# Patient Record
Sex: FEMALE | Race: WHITE | NOT HISPANIC OR LATINO
[De-identification: names, ages, dates, MRNs, and addresses within clinical notes are randomized per-mention and may not be internally consistent; named-entity substitution may affect disease eponyms.]

---

## 2017-06-04 ENCOUNTER — RESULT REVIEW (OUTPATIENT)
Age: 52
End: 2017-06-04

## 2017-11-21 ENCOUNTER — OUTPATIENT (OUTPATIENT)
Dept: OUTPATIENT SERVICES | Facility: HOSPITAL | Age: 52
LOS: 1 days | End: 2017-11-21
Payer: COMMERCIAL

## 2017-11-21 VITALS
HEIGHT: 60 IN | TEMPERATURE: 98 F | HEART RATE: 71 BPM | WEIGHT: 158.07 LBS | OXYGEN SATURATION: 99 % | RESPIRATION RATE: 14 BRPM

## 2017-11-21 DIAGNOSIS — D25.1 INTRAMURAL LEIOMYOMA OF UTERUS: ICD-10-CM

## 2017-11-21 DIAGNOSIS — Z01.818 ENCOUNTER FOR OTHER PREPROCEDURAL EXAMINATION: ICD-10-CM

## 2017-11-21 LAB
ANION GAP SERPL CALC-SCNC: 14 MMOL/L — SIGNIFICANT CHANGE UP (ref 5–17)
BLD GP AB SCN SERPL QL: NEGATIVE — SIGNIFICANT CHANGE UP
BUN SERPL-MCNC: 15 MG/DL — SIGNIFICANT CHANGE UP (ref 7–23)
CALCIUM SERPL-MCNC: 9.4 MG/DL — SIGNIFICANT CHANGE UP (ref 8.4–10.5)
CHLORIDE SERPL-SCNC: 103 MMOL/L — SIGNIFICANT CHANGE UP (ref 96–108)
CO2 SERPL-SCNC: 22 MMOL/L — SIGNIFICANT CHANGE UP (ref 22–31)
CREAT SERPL-MCNC: 0.59 MG/DL — SIGNIFICANT CHANGE UP (ref 0.5–1.3)
GLUCOSE SERPL-MCNC: 87 MG/DL — SIGNIFICANT CHANGE UP (ref 70–99)
HCT VFR BLD CALC: 34.8 % — SIGNIFICANT CHANGE UP (ref 34.5–45)
HGB BLD-MCNC: 10.9 G/DL — LOW (ref 11.5–15.5)
MCHC RBC-ENTMCNC: 19.2 PG — LOW (ref 27–34)
MCHC RBC-ENTMCNC: 31.3 GM/DL — LOW (ref 32–36)
MCV RBC AUTO: 61.3 FL — LOW (ref 80–100)
PLATELET # BLD AUTO: 283 K/UL — SIGNIFICANT CHANGE UP (ref 150–400)
POTASSIUM SERPL-MCNC: 4.2 MMOL/L — SIGNIFICANT CHANGE UP (ref 3.5–5.3)
POTASSIUM SERPL-SCNC: 4.2 MMOL/L — SIGNIFICANT CHANGE UP (ref 3.5–5.3)
RBC # BLD: 5.68 M/UL — HIGH (ref 3.8–5.2)
RBC # FLD: 15.8 % — HIGH (ref 10.3–14.5)
RH IG SCN BLD-IMP: POSITIVE — SIGNIFICANT CHANGE UP
SODIUM SERPL-SCNC: 139 MMOL/L — SIGNIFICANT CHANGE UP (ref 135–145)
WBC # BLD: 6.86 K/UL — SIGNIFICANT CHANGE UP (ref 3.8–10.5)
WBC # FLD AUTO: 6.86 K/UL — SIGNIFICANT CHANGE UP (ref 3.8–10.5)

## 2017-11-21 PROCEDURE — 86850 RBC ANTIBODY SCREEN: CPT

## 2017-11-21 PROCEDURE — 80048 BASIC METABOLIC PNL TOTAL CA: CPT

## 2017-11-21 PROCEDURE — G0463: CPT

## 2017-11-21 PROCEDURE — 85027 COMPLETE CBC AUTOMATED: CPT

## 2017-11-21 PROCEDURE — 86900 BLOOD TYPING SEROLOGIC ABO: CPT

## 2017-11-21 PROCEDURE — 86901 BLOOD TYPING SEROLOGIC RH(D): CPT

## 2017-11-21 RX ORDER — ACETAMINOPHEN 500 MG
975 TABLET ORAL ONCE
Qty: 0 | Refills: 0 | Status: COMPLETED | OUTPATIENT
Start: 2017-11-28 | End: 2017-11-28

## 2017-11-21 RX ORDER — CEFOTETAN DISODIUM 1 G
2 VIAL (EA) INJECTION ONCE
Qty: 0 | Refills: 0 | Status: DISCONTINUED | OUTPATIENT
Start: 2017-11-28 | End: 2017-11-30

## 2017-11-21 RX ORDER — FAMOTIDINE 10 MG/ML
20 INJECTION INTRAVENOUS ONCE
Qty: 0 | Refills: 0 | Status: COMPLETED | OUTPATIENT
Start: 2017-11-28 | End: 2017-11-28

## 2017-11-21 RX ORDER — OXYCODONE HYDROCHLORIDE 5 MG/1
10 TABLET ORAL ONCE
Qty: 0 | Refills: 0 | Status: DISCONTINUED | OUTPATIENT
Start: 2017-11-28 | End: 2017-11-28

## 2017-11-21 NOTE — H&P PST ADULT - ACTIVITY
able to walk up 2 flights of stairs carrying grocery active at work, able to walk up 2 flights of stairs carrying grocery

## 2017-11-21 NOTE — H&P PST ADULT - NSANTHOSAYNRD_GEN_A_CORE
No. ANGELINA screening performed.  STOP BANG Legend: 0-2 = LOW Risk; 3-4 = INTERMEDIATE Risk; 5-8 = HIGH Risk

## 2017-11-21 NOTE — H&P PST ADULT - HISTORY OF PRESENT ILLNESS
53 yo female. LMP 10/22/2017.  c/o menorrhagia, diagnosed with leiomyoma, recent imaging study revealed size increase of leiomyoma.  presents to PST scheduled for hyserectomy. 53 yo female. . LMP 10/22/2017.  c/o menorrhagia, diagnosed with leiomyoma, recent imaging study revealed size increase of leiomyoma.  presents to PST scheduled for hyserectomy.

## 2017-11-28 ENCOUNTER — RESULT REVIEW (OUTPATIENT)
Age: 52
End: 2017-11-28

## 2017-11-28 ENCOUNTER — INPATIENT (INPATIENT)
Facility: HOSPITAL | Age: 52
LOS: 2 days | Discharge: HOME | DRG: 743 | End: 2017-12-01
Attending: OBSTETRICS & GYNECOLOGY | Admitting: OBSTETRICS & GYNECOLOGY
Payer: COMMERCIAL

## 2017-11-28 VITALS
OXYGEN SATURATION: 100 % | HEART RATE: 72 BPM | TEMPERATURE: 98 F | WEIGHT: 158.07 LBS | RESPIRATION RATE: 18 BRPM | DIASTOLIC BLOOD PRESSURE: 82 MMHG | HEIGHT: 60 IN | SYSTOLIC BLOOD PRESSURE: 138 MMHG

## 2017-11-28 DIAGNOSIS — D25.1 INTRAMURAL LEIOMYOMA OF UTERUS: ICD-10-CM

## 2017-11-28 DIAGNOSIS — Z90.710 ACQUIRED ABSENCE OF BOTH CERVIX AND UTERUS: ICD-10-CM

## 2017-11-28 DIAGNOSIS — Z01.818 ENCOUNTER FOR OTHER PREPROCEDURAL EXAMINATION: ICD-10-CM

## 2017-11-28 LAB
HCG UR QL: NEGATIVE — SIGNIFICANT CHANGE UP
RH IG SCN BLD-IMP: POSITIVE — SIGNIFICANT CHANGE UP

## 2017-11-28 PROCEDURE — 88307 TISSUE EXAM BY PATHOLOGIST: CPT | Mod: 26

## 2017-11-28 RX ORDER — SODIUM CHLORIDE 9 MG/ML
1000 INJECTION, SOLUTION INTRAVENOUS
Qty: 0 | Refills: 0 | Status: DISCONTINUED | OUTPATIENT
Start: 2017-11-28 | End: 2017-11-29

## 2017-11-28 RX ORDER — KETOROLAC TROMETHAMINE 30 MG/ML
30 SYRINGE (ML) INJECTION EVERY 6 HOURS
Qty: 0 | Refills: 0 | Status: DISCONTINUED | OUTPATIENT
Start: 2017-11-28 | End: 2017-11-29

## 2017-11-28 RX ORDER — HEPARIN SODIUM 5000 [USP'U]/ML
5000 INJECTION INTRAVENOUS; SUBCUTANEOUS EVERY 12 HOURS
Qty: 0 | Refills: 0 | Status: DISCONTINUED | OUTPATIENT
Start: 2017-11-28 | End: 2017-12-01

## 2017-11-28 RX ORDER — HYDROMORPHONE HYDROCHLORIDE 2 MG/ML
0.5 INJECTION INTRAMUSCULAR; INTRAVENOUS; SUBCUTANEOUS
Qty: 0 | Refills: 0 | Status: DISCONTINUED | OUTPATIENT
Start: 2017-11-28 | End: 2017-11-28

## 2017-11-28 RX ORDER — HYDROMORPHONE HYDROCHLORIDE 2 MG/ML
0.5 INJECTION INTRAMUSCULAR; INTRAVENOUS; SUBCUTANEOUS
Qty: 0 | Refills: 0 | Status: DISCONTINUED | OUTPATIENT
Start: 2017-11-28 | End: 2017-11-30

## 2017-11-28 RX ORDER — NALOXONE HYDROCHLORIDE 4 MG/.1ML
0.1 SPRAY NASAL
Qty: 0 | Refills: 0 | Status: DISCONTINUED | OUTPATIENT
Start: 2017-11-28 | End: 2017-11-28

## 2017-11-28 RX ORDER — MORPHINE SULFATE 50 MG/1
30 CAPSULE, EXTENDED RELEASE ORAL
Qty: 0 | Refills: 0 | Status: DISCONTINUED | OUTPATIENT
Start: 2017-11-28 | End: 2017-11-28

## 2017-11-28 RX ORDER — DOCUSATE SODIUM 100 MG
100 CAPSULE ORAL
Qty: 0 | Refills: 0 | Status: DISCONTINUED | OUTPATIENT
Start: 2017-11-28 | End: 2017-12-01

## 2017-11-28 RX ORDER — HYDROMORPHONE HYDROCHLORIDE 2 MG/ML
30 INJECTION INTRAMUSCULAR; INTRAVENOUS; SUBCUTANEOUS
Qty: 0 | Refills: 0 | Status: DISCONTINUED | OUTPATIENT
Start: 2017-11-28 | End: 2017-11-30

## 2017-11-28 RX ORDER — CELECOXIB 200 MG/1
200 CAPSULE ORAL ONCE
Qty: 0 | Refills: 0 | Status: COMPLETED | OUTPATIENT
Start: 2017-11-28 | End: 2017-11-28

## 2017-11-28 RX ORDER — NALOXONE HYDROCHLORIDE 4 MG/.1ML
0.1 SPRAY NASAL
Qty: 0 | Refills: 0 | Status: DISCONTINUED | OUTPATIENT
Start: 2017-11-28 | End: 2017-11-30

## 2017-11-28 RX ORDER — LIDOCAINE HCL 20 MG/ML
0.2 VIAL (ML) INJECTION ONCE
Qty: 0 | Refills: 0 | Status: DISCONTINUED | OUTPATIENT
Start: 2017-11-28 | End: 2017-11-28

## 2017-11-28 RX ORDER — ONDANSETRON 8 MG/1
4 TABLET, FILM COATED ORAL EVERY 6 HOURS
Qty: 0 | Refills: 0 | Status: DISCONTINUED | OUTPATIENT
Start: 2017-11-28 | End: 2017-11-30

## 2017-11-28 RX ORDER — SODIUM CHLORIDE 9 MG/ML
3 INJECTION INTRAMUSCULAR; INTRAVENOUS; SUBCUTANEOUS EVERY 8 HOURS
Qty: 0 | Refills: 0 | Status: DISCONTINUED | OUTPATIENT
Start: 2017-11-28 | End: 2017-11-28

## 2017-11-28 RX ORDER — ONDANSETRON 8 MG/1
4 TABLET, FILM COATED ORAL EVERY 6 HOURS
Qty: 0 | Refills: 0 | Status: DISCONTINUED | OUTPATIENT
Start: 2017-11-28 | End: 2017-11-28

## 2017-11-28 RX ORDER — ONDANSETRON 8 MG/1
4 TABLET, FILM COATED ORAL ONCE
Qty: 0 | Refills: 0 | Status: DISCONTINUED | OUTPATIENT
Start: 2017-11-28 | End: 2017-11-28

## 2017-11-28 RX ADMIN — HEPARIN SODIUM 5000 UNIT(S): 5000 INJECTION INTRAVENOUS; SUBCUTANEOUS at 18:53

## 2017-11-28 RX ADMIN — HYDROMORPHONE HYDROCHLORIDE 30 MILLILITER(S): 2 INJECTION INTRAMUSCULAR; INTRAVENOUS; SUBCUTANEOUS at 19:56

## 2017-11-28 RX ADMIN — Medication 30 MILLIGRAM(S): at 18:51

## 2017-11-28 RX ADMIN — OXYCODONE HYDROCHLORIDE 10 MILLIGRAM(S): 5 TABLET ORAL at 13:56

## 2017-11-28 RX ADMIN — SODIUM CHLORIDE 125 MILLILITER(S): 9 INJECTION, SOLUTION INTRAVENOUS at 19:58

## 2017-11-28 RX ADMIN — Medication 975 MILLIGRAM(S): at 11:35

## 2017-11-28 RX ADMIN — HYDROMORPHONE HYDROCHLORIDE 30 MILLILITER(S): 2 INJECTION INTRAMUSCULAR; INTRAVENOUS; SUBCUTANEOUS at 17:57

## 2017-11-28 RX ADMIN — SODIUM CHLORIDE 3 MILLILITER(S): 9 INJECTION INTRAMUSCULAR; INTRAVENOUS; SUBCUTANEOUS at 11:36

## 2017-11-28 RX ADMIN — FAMOTIDINE 20 MILLIGRAM(S): 10 INJECTION INTRAVENOUS at 11:35

## 2017-11-28 RX ADMIN — CELECOXIB 200 MILLIGRAM(S): 200 CAPSULE ORAL at 11:35

## 2017-11-28 NOTE — BRIEF OPERATIVE NOTE - PROCEDURE
<<-----Click on this checkbox to enter Procedure Bilateral salpingoophorectomy  11/28/2017    Active  TSPIEGEL  Total abd hysterectomy  11/28/2017    Active  TSPIEGEL

## 2017-11-29 LAB
HCT VFR BLD CALC: 32.6 % — LOW (ref 34.5–45)
HCV AB S/CO SERPL IA: 0.14 S/CO — SIGNIFICANT CHANGE UP
HCV AB SERPL-IMP: SIGNIFICANT CHANGE UP
HGB BLD-MCNC: 10.8 G/DL — LOW (ref 11.5–15.5)
HIV 1+2 AB+HIV1 P24 AG SERPL QL IA: SIGNIFICANT CHANGE UP
MCHC RBC-ENTMCNC: 21 PG — LOW (ref 27–34)
MCHC RBC-ENTMCNC: 33.2 GM/DL — SIGNIFICANT CHANGE UP (ref 32–36)
MCV RBC AUTO: 63.2 FL — LOW (ref 80–100)
PLATELET # BLD AUTO: 244 K/UL — SIGNIFICANT CHANGE UP (ref 150–400)
RBC # BLD: 5.15 M/UL — SIGNIFICANT CHANGE UP (ref 3.8–5.2)
RBC # FLD: 14.1 % — SIGNIFICANT CHANGE UP (ref 10.3–14.5)
WBC # BLD: 14.5 K/UL — HIGH (ref 3.8–10.5)
WBC # FLD AUTO: 14.5 K/UL — HIGH (ref 3.8–10.5)

## 2017-11-29 RX ORDER — ACETAMINOPHEN 500 MG
975 TABLET ORAL ONCE
Qty: 0 | Refills: 0 | Status: COMPLETED | OUTPATIENT
Start: 2017-11-29 | End: 2017-11-29

## 2017-11-29 RX ORDER — IBUPROFEN 200 MG
600 TABLET ORAL EVERY 6 HOURS
Qty: 0 | Refills: 0 | Status: DISCONTINUED | OUTPATIENT
Start: 2017-11-29 | End: 2017-11-29

## 2017-11-29 RX ORDER — ACETAMINOPHEN 500 MG
975 TABLET ORAL EVERY 6 HOURS
Qty: 0 | Refills: 0 | Status: DISCONTINUED | OUTPATIENT
Start: 2017-11-29 | End: 2017-11-29

## 2017-11-29 RX ORDER — SODIUM CHLORIDE 9 MG/ML
1000 INJECTION, SOLUTION INTRAVENOUS
Qty: 0 | Refills: 0 | Status: DISCONTINUED | OUTPATIENT
Start: 2017-11-29 | End: 2017-11-30

## 2017-11-29 RX ORDER — KETOROLAC TROMETHAMINE 30 MG/ML
30 SYRINGE (ML) INJECTION EVERY 6 HOURS
Qty: 0 | Refills: 0 | Status: DISCONTINUED | OUTPATIENT
Start: 2017-11-29 | End: 2017-11-30

## 2017-11-29 RX ORDER — ACETAMINOPHEN 500 MG
1000 TABLET ORAL ONCE
Qty: 0 | Refills: 0 | Status: COMPLETED | OUTPATIENT
Start: 2017-11-29 | End: 2017-11-29

## 2017-11-29 RX ORDER — ONDANSETRON 8 MG/1
4 TABLET, FILM COATED ORAL ONCE
Qty: 0 | Refills: 0 | Status: COMPLETED | OUTPATIENT
Start: 2017-11-29 | End: 2017-11-29

## 2017-11-29 RX ORDER — INFLUENZA VIRUS VACCINE 15; 15; 15; 15 UG/.5ML; UG/.5ML; UG/.5ML; UG/.5ML
0.5 SUSPENSION INTRAMUSCULAR ONCE
Qty: 0 | Refills: 0 | Status: DISCONTINUED | OUTPATIENT
Start: 2017-11-29 | End: 2017-12-01

## 2017-11-29 RX ADMIN — HYDROMORPHONE HYDROCHLORIDE 30 MILLILITER(S): 2 INJECTION INTRAMUSCULAR; INTRAVENOUS; SUBCUTANEOUS at 07:59

## 2017-11-29 RX ADMIN — Medication 975 MILLIGRAM(S): at 12:54

## 2017-11-29 RX ADMIN — Medication 30 MILLIGRAM(S): at 05:46

## 2017-11-29 RX ADMIN — Medication 100 MILLIGRAM(S): at 05:45

## 2017-11-29 RX ADMIN — HEPARIN SODIUM 5000 UNIT(S): 5000 INJECTION INTRAVENOUS; SUBCUTANEOUS at 17:57

## 2017-11-29 RX ADMIN — HYDROMORPHONE HYDROCHLORIDE 30 MILLILITER(S): 2 INJECTION INTRAMUSCULAR; INTRAVENOUS; SUBCUTANEOUS at 19:33

## 2017-11-29 RX ADMIN — Medication 1 TABLET(S): at 22:05

## 2017-11-29 RX ADMIN — Medication 975 MILLIGRAM(S): at 03:17

## 2017-11-29 RX ADMIN — Medication 30 MILLIGRAM(S): at 12:54

## 2017-11-29 RX ADMIN — Medication 30 MILLIGRAM(S): at 00:40

## 2017-11-29 RX ADMIN — Medication 30 MILLIGRAM(S): at 13:40

## 2017-11-29 RX ADMIN — ONDANSETRON 4 MILLIGRAM(S): 8 TABLET, FILM COATED ORAL at 00:40

## 2017-11-29 RX ADMIN — Medication 1 TABLET(S): at 21:35

## 2017-11-29 RX ADMIN — Medication 30 MILLIGRAM(S): at 01:10

## 2017-11-29 RX ADMIN — HEPARIN SODIUM 5000 UNIT(S): 5000 INJECTION INTRAVENOUS; SUBCUTANEOUS at 05:45

## 2017-11-29 RX ADMIN — Medication 1000 MILLIGRAM(S): at 13:40

## 2017-11-29 RX ADMIN — ONDANSETRON 4 MILLIGRAM(S): 8 TABLET, FILM COATED ORAL at 10:20

## 2017-11-29 RX ADMIN — Medication 400 MILLIGRAM(S): at 13:29

## 2017-11-29 NOTE — CHART NOTE - NSCHARTNOTEFT_GEN_A_CORE
R4 note  patient seen at bedside emesis x1  no bowel sounds heard, abd soft, NT, mildly distended  will give IV tylenol/toradol  patient self restricting diet to NPO  LR@125    [id number]

## 2017-11-30 ENCOUNTER — TRANSCRIPTION ENCOUNTER (OUTPATIENT)
Age: 52
End: 2017-11-30

## 2017-11-30 LAB
HCT VFR BLD CALC: 31.4 % — LOW (ref 34.5–45)
HGB BLD-MCNC: 10.3 G/DL — LOW (ref 11.5–15.5)
MCHC RBC-ENTMCNC: 20.9 PG — LOW (ref 27–34)
MCHC RBC-ENTMCNC: 32.9 GM/DL — SIGNIFICANT CHANGE UP (ref 32–36)
MCV RBC AUTO: 63.4 FL — LOW (ref 80–100)
PLATELET # BLD AUTO: 250 K/UL — SIGNIFICANT CHANGE UP (ref 150–400)
RBC # BLD: 4.95 M/UL — SIGNIFICANT CHANGE UP (ref 3.8–5.2)
RBC # FLD: 14.1 % — SIGNIFICANT CHANGE UP (ref 10.3–14.5)
WBC # BLD: 10.9 K/UL — HIGH (ref 3.8–10.5)
WBC # FLD AUTO: 10.9 K/UL — HIGH (ref 3.8–10.5)

## 2017-11-30 RX ORDER — IBUPROFEN 200 MG
600 TABLET ORAL EVERY 6 HOURS
Qty: 0 | Refills: 0 | Status: DISCONTINUED | OUTPATIENT
Start: 2017-11-30 | End: 2017-12-01

## 2017-11-30 RX ORDER — OXYCODONE HYDROCHLORIDE 5 MG/1
5 TABLET ORAL
Qty: 0 | Refills: 0 | Status: DISCONTINUED | OUTPATIENT
Start: 2017-11-30 | End: 2017-12-01

## 2017-11-30 RX ORDER — ACETAMINOPHEN 500 MG
975 TABLET ORAL EVERY 6 HOURS
Qty: 0 | Refills: 0 | Status: DISCONTINUED | OUTPATIENT
Start: 2017-11-30 | End: 2017-12-01

## 2017-11-30 RX ADMIN — Medication 100 MILLIGRAM(S): at 18:35

## 2017-11-30 RX ADMIN — Medication 600 MILLIGRAM(S): at 10:34

## 2017-11-30 RX ADMIN — Medication 100 MILLIGRAM(S): at 06:18

## 2017-11-30 RX ADMIN — Medication 600 MILLIGRAM(S): at 16:25

## 2017-11-30 RX ADMIN — Medication 600 MILLIGRAM(S): at 21:10

## 2017-11-30 RX ADMIN — HEPARIN SODIUM 5000 UNIT(S): 5000 INJECTION INTRAVENOUS; SUBCUTANEOUS at 18:35

## 2017-11-30 RX ADMIN — Medication 975 MILLIGRAM(S): at 18:35

## 2017-11-30 RX ADMIN — HEPARIN SODIUM 5000 UNIT(S): 5000 INJECTION INTRAVENOUS; SUBCUTANEOUS at 06:18

## 2017-11-30 NOTE — DISCHARGE NOTE ADULT - CARE PROVIDER_API CALL
Campbell Duggan (MD), Obstetrics and Gynecology  [address]  [address]  [place] [address]  Phone: [phone]  Fax: [phone]

## 2017-11-30 NOTE — DISCHARGE NOTE ADULT - CARE PLAN
Principal Discharge DX:	S/P abdominal hysterectomy  Goal:	Recovery  Instructions for follow-up, activity and diet:	Regular diet as tolerated, regular activity as tolerated, no heavy lifting for first two weeks.  Nothing per vagina: no intercourse, tampons or douching.  Call your provider if you experience fevers, chills, worsening abdominal pain, inability to urinate or worsening vaginal bleeding.  Keep incision site clean and dry  Follow up with your provider in ********* Principal Discharge DX:	S/P abdominal hysterectomy  Goal:	Recovery  Instructions for follow-up, activity and diet:	Regular diet as tolerated, regular activity as tolerated, no heavy lifting for first two weeks.  Nothing per vagina: no intercourse, tampons or douching.  Call your provider if you experience fevers, chills, worsening abdominal pain, inability to urinate or worsening vaginal bleeding.  Keep incision site clean and dry  Follow up with your provider in 7-10 days

## 2017-11-30 NOTE — DISCHARGE NOTE ADULT - MEDICATION SUMMARY - MEDICATIONS TO TAKE
I will START or STAY ON the medications listed below when I get home from the hospital:    oxyCODONE 5 mg oral tablet  -- 1 tab(s) by mouth every 6 hours -for severe pain MDD:4 pills  -- Caution federal law prohibits the transfer of this drug to any person other  than the person for whom it was prescribed.  It is very important that you take or use this exactly as directed.  Do not skip doses or discontinue unless directed by your doctor.  May cause drowsiness.  Alcohol may intensify this effect.  Use care when operating dangerous machinery.  This prescription cannot be refilled.  Using more of this medication than prescribed may cause serious breathing problems.    -- Indication: For Pain    acetaminophen 325 mg oral tablet  -- 3 tab(s) by mouth every 6 hours  -- Indication: For Pain    ibuprofen 600 mg oral tablet  -- 1 tab(s) by mouth every 6 hours  -- Indication: For Pain

## 2017-11-30 NOTE — DISCHARGE NOTE ADULT - HOSPITAL COURSE
Patient had uncomplicated total abdominal hysterectomy, bilateral salpingo-oophorectomy w/ a vertical incision.    Please see operative note for details.  During postoperative course patient's vitals were stable, vaginal bleeding appropriate, and pain well controlled.  Post operation day one hematocrit was 32.6_.  On day of discharge patient was ambulating, her pain controlled with oral medications, had adequate oral intake, and was voiding freely.  Discharge instructions and precautions were given.  Will have close follow up with Dr. Duggan.    [place] were/to be removed on ****** Patient had uncomplicated total abdominal hysterectomy, bilateral salpingo-oophorectomy w/ a vertical incision.    Please see operative note for details.  During postoperative course patient's vitals were stable, vaginal bleeding appropriate, and pain well controlled.  Post operation day one hematocrit was 32.6.  On day of discharge patient was ambulating, her pain controlled with oral medications, had adequate oral intake, and was voiding freely.  Discharge instructions and precautions were given.  Will have close follow up with Dr. Duggan.    Cam to be removed in office in 7-10 days

## 2017-11-30 NOTE — DISCHARGE NOTE ADULT - PATIENT PORTAL LINK FT
“You can access the Follow[facility] Patient Portal, offered by [facility], by registering with the following website: [email or web address]”

## 2017-11-30 NOTE — DISCHARGE NOTE ADULT - PLAN OF CARE
Recovery Regular diet as tolerated, regular activity as tolerated, no heavy lifting for first two weeks.  Nothing per vagina: no intercourse, tampons or douching.  Call your provider if you experience fevers, chills, worsening abdominal pain, inability to urinate or worsening vaginal bleeding.  Keep incision site clean and dry  Follow up with your provider in ********* Regular diet as tolerated, regular activity as tolerated, no heavy lifting for first two weeks.  Nothing per vagina: no intercourse, tampons or douching.  Call your provider if you experience fevers, chills, worsening abdominal pain, inability to urinate or worsening vaginal bleeding.  Keep incision site clean and dry  Follow up with your provider in 7-10 days

## 2017-12-01 VITALS
SYSTOLIC BLOOD PRESSURE: 130 MMHG | OXYGEN SATURATION: 96 % | RESPIRATION RATE: 18 BRPM | HEART RATE: 75 BPM | DIASTOLIC BLOOD PRESSURE: 88 MMHG | TEMPERATURE: 99 F

## 2017-12-01 LAB
HCT VFR BLD CALC: 33.7 % — LOW (ref 34.5–45)
HGB BLD-MCNC: 11.1 G/DL — LOW (ref 11.5–15.5)
MCHC RBC-ENTMCNC: 21 PG — LOW (ref 27–34)
MCHC RBC-ENTMCNC: 32.9 GM/DL — SIGNIFICANT CHANGE UP (ref 32–36)
MCV RBC AUTO: 63.7 FL — LOW (ref 80–100)
PLATELET # BLD AUTO: 274 K/UL — SIGNIFICANT CHANGE UP (ref 150–400)
RBC # BLD: 5.29 M/UL — HIGH (ref 3.8–5.2)
RBC # FLD: 14 % — SIGNIFICANT CHANGE UP (ref 10.3–14.5)
WBC # BLD: 8.9 K/UL — SIGNIFICANT CHANGE UP (ref 3.8–10.5)
WBC # FLD AUTO: 8.9 K/UL — SIGNIFICANT CHANGE UP (ref 3.8–10.5)

## 2017-12-01 PROCEDURE — 85027 COMPLETE CBC AUTOMATED: CPT

## 2017-12-01 PROCEDURE — 86901 BLOOD TYPING SEROLOGIC RH(D): CPT

## 2017-12-01 PROCEDURE — C1765: CPT

## 2017-12-01 PROCEDURE — 87389 HIV-1 AG W/HIV-1&-2 AB AG IA: CPT

## 2017-12-01 PROCEDURE — 86803 HEPATITIS C AB TEST: CPT

## 2017-12-01 PROCEDURE — 81025 URINE PREGNANCY TEST: CPT

## 2017-12-01 PROCEDURE — 88307 TISSUE EXAM BY PATHOLOGIST: CPT

## 2017-12-01 PROCEDURE — 86900 BLOOD TYPING SEROLOGIC ABO: CPT

## 2017-12-01 RX ORDER — OXYCODONE HYDROCHLORIDE 5 MG/1
1 TABLET ORAL
Qty: 15 | Refills: 0 | OUTPATIENT
Start: 2017-12-01

## 2017-12-01 RX ORDER — IBUPROFEN 200 MG
1 TABLET ORAL
Qty: 0 | Refills: 0 | COMMUNITY
Start: 2017-12-01

## 2017-12-01 RX ORDER — ACETAMINOPHEN 500 MG
3 TABLET ORAL
Qty: 0 | Refills: 0 | COMMUNITY
Start: 2017-12-01

## 2017-12-01 RX ADMIN — Medication 100 MILLIGRAM(S): at 05:10

## 2017-12-01 RX ADMIN — Medication 975 MILLIGRAM(S): at 12:30

## 2017-12-01 RX ADMIN — Medication 975 MILLIGRAM(S): at 00:31

## 2017-12-01 RX ADMIN — Medication 975 MILLIGRAM(S): at 05:10

## 2017-12-01 RX ADMIN — Medication 600 MILLIGRAM(S): at 10:10

## 2017-12-01 RX ADMIN — Medication 600 MILLIGRAM(S): at 09:16

## 2017-12-01 RX ADMIN — Medication 600 MILLIGRAM(S): at 05:10

## 2017-12-01 RX ADMIN — HEPARIN SODIUM 5000 UNIT(S): 5000 INJECTION INTRAVENOUS; SUBCUTANEOUS at 05:10

## 2017-12-01 RX ADMIN — Medication 975 MILLIGRAM(S): at 05:45

## 2017-12-01 RX ADMIN — Medication 975 MILLIGRAM(S): at 11:54

## 2017-12-01 RX ADMIN — Medication 600 MILLIGRAM(S): at 05:45

## 2017-12-01 NOTE — PROGRESS NOTE ADULT - SUBJECTIVE AND OBJECTIVE BOX
Patient seen and examined at bedside, recently post-op. Pain well controlled. Reports mild nausea. Denies CP, SOB, vomiting, fevers, and chills.    Vital Signs Last 24 Hours  T(C): 36.8 (11-28-17 @ 21:23), Max: 36.9 (11-28-17 @ 20:20)  HR: 74 (11-28-17 @ 21:23) (65 - 74)  BP: 130/74 (11-28-17 @ 21:23) (129/84 - 150/88)  RR: 18 (11-28-17 @ 21:23) (16 - 18)  SpO2: 96% (11-28-17 @ 21:23) (96% - 100%)    I&O's Summary    28 Nov 2017 07:01  -  28 Nov 2017 22:05  --------------------------------------------------------  IN: 375 mL / OUT: 1275 mL / NET: -900 mL      Physical Exam:  General: NAD  CV: NR, RR, S1, S2, no M/R/G  Lungs: CTA-B  Abdomen: Soft, appropriately tender, non-distended, +BS  Incision: Vertical incision dressing CDI  Ext: No pain or swelling    MEDICATIONS  (STANDING):  cefoTEtan  IVPB 2 Gram(s) IV Intermittent once  docusate sodium 100 milliGRAM(s) Oral two times a day  heparin  Injectable 5000 Unit(s) SubCutaneous every 12 hours  HYDROmorphone PCA (1 mG/mL) 30 milliLiter(s) PCA Continuous PCA Continuous  ketorolac   Injectable 30 milliGRAM(s) IV Push every 6 hours  lactated ringers. 1000 milliLiter(s) (125 mL/Hr) IV Continuous <Continuous>    MEDICATIONS  (PRN):  HYDROmorphone PCA (1 mG/mL) Rescue Clinician Bolus 0.5 milliGRAM(s) IV Push every 15 minutes PRN for Pain Scale GREATER THAN 6  naloxone Injectable 0.1 milliGRAM(s) IV Push every 3 minutes PRN For ANY of the following changes in patient status:  A. RR LESS THAN 10 breaths per minute, B. Oxygen saturation LESS THAN 90%, C. Sedation score of 6  ondansetron Injectable 4 milliGRAM(s) IV Push every 6 hours PRN Nausea
Day 2 of Anesthesia Pain Management Service    SUBJECTIVE: Patient is doing well with IV PCA    Pain Scale Score:	[X] Refer to charted pain scores    THERAPY:    [ ] IV PCA Morphine		[ ] 5 mg/mL	[ ] 1 mg/mL  [X] IV PCA Hydromorphone	[ ] 5 mg/mL	[X] 1 mg/mL  [ ] IV PCA Fentanyl		[ ] 50 micrograms/mL    Demand dose: 0.2 mg     Lockout: 6 minutes   Continuous Rate: 0 mg/hr  4 Hour Limit: 4 mg    MEDICATIONS  (STANDING):  acetaminophen   Tablet. 975 milliGRAM(s) Oral every 6 hours  docusate sodium 100 milliGRAM(s) Oral two times a day  heparin  Injectable 5000 Unit(s) SubCutaneous every 12 hours  ibuprofen  Tablet 600 milliGRAM(s) Oral every 6 hours  influenza   Vaccine 0.5 milliLiter(s) IntraMuscular once    MEDICATIONS  (PRN):  oxyCODONE    IR 5 milliGRAM(s) Oral every 3 hours PRN Moderate Pain (4 - 6)      OBJECTIVE:    Sedation Score:	[ X] Alert	[ ] Drowsy 	[ ] Arousable	[ ] Asleep	[ ] Unresponsive    Side Effects:	[X ] None	[ ] Nausea	[ ] Vomiting	[ ] Pruritus  		[ ] Other:    Vital Signs Last 24 Hrs  T(C): 36.9 (30 Nov 2017 05:58), Max: 37.3 (29 Nov 2017 16:48)  T(F): 98.4 (30 Nov 2017 05:58), Max: 99.1 (29 Nov 2017 16:48)  HR: 65 (30 Nov 2017 05:58) (65 - 77)  BP: 144/88 (30 Nov 2017 05:58) (144/88 - 153/90)  BP(mean): --  RR: 16 (30 Nov 2017 05:58) (16 - 17)  SpO2: 95% (30 Nov 2017 05:58) (95% - 97%)    ASSESSMENT/ PLAN    Therapy to  be:               [  ] Continued   [ X] Discontinued   [X ] Changed to PRN Analgesics    Documentation and Verification of current medications:   [X] Done	[ ] Not done, not eligible    Comments:
Day _1 of Anesthesia Pain Management Service    SUBJECTIVE: Patient is doing well with IV PCA    Pain Scale Score:	[X] Refer to charted pain scores    THERAPY:    [ ] IV PCA Morphine		[ ] 5 mg/mL	[ ] 1 mg/mL  [X] IV PCA Hydromorphone	[ ] 5 mg/mL	[X] 1 mg/mL  [ ] IV PCA Fentanyl		[ ] 50 micrograms/mL    Demand dose: 0.2 mg     Lockout: 6 minutes   Continuous Rate: 0 mg/hr  4 Hour Limit: 4 mg    MEDICATIONS  (STANDING):  acetaminophen   Tablet. 975 milliGRAM(s) Oral every 6 hours  cefoTEtan  IVPB 2 Gram(s) IV Intermittent once  docusate sodium 100 milliGRAM(s) Oral two times a day  heparin  Injectable 5000 Unit(s) SubCutaneous every 12 hours  HYDROmorphone PCA (1 mG/mL) 30 milliLiter(s) PCA Continuous PCA Continuous  influenza   Vaccine 0.5 milliLiter(s) IntraMuscular once  ketorolac   Injectable 30 milliGRAM(s) IV Push every 6 hours  lactated ringers. 1000 milliLiter(s) (125 mL/Hr) IV Continuous <Continuous>    MEDICATIONS  (PRN):  HYDROmorphone PCA (1 mG/mL) Rescue Clinician Bolus 0.5 milliGRAM(s) IV Push every 15 minutes PRN for Pain Scale GREATER THAN 6  ibuprofen  Tablet 600 milliGRAM(s) Oral every 6 hours PRN mild pain  naloxone Injectable 0.1 milliGRAM(s) IV Push every 3 minutes PRN For ANY of the following changes in patient status:  A. RR LESS THAN 10 breaths per minute, B. Oxygen saturation LESS THAN 90%, C. Sedation score of 6  ondansetron Injectable 4 milliGRAM(s) IV Push every 6 hours PRN Nausea      OBJECTIVE:    Sedation Score:	[ X] Alert	[ ] Drowsy 	[ ] Arousable	[ ] Asleep	[ ] Unresponsive    Side Effects:	[X ] None	[ ] Nausea	[ ] Vomiting	[ ] Pruritus  		[ ] Other:    Vital Signs Last 24 Hrs  T(C): 37 (29 Nov 2017 08:20), Max: 37 (28 Nov 2017 22:23)  T(F): 98.6 (29 Nov 2017 08:20), Max: 98.6 (28 Nov 2017 22:23)  HR: 72 (29 Nov 2017 04:57) (65 - 75)  BP: 156/92 (29 Nov 2017 04:57) (127/80 - 156/92)  BP(mean): 106 (28 Nov 2017 19:00) (103 - 114)  RR: 17 (29 Nov 2017 08:20) (16 - 18)  SpO2: 96% (29 Nov 2017 08:20) (95% - 100%)    ASSESSMENT/ PLAN    Therapy to  be:               [X] Continued   [ ] Discontinued   [ ] Changed to PRN Analgesics    Documentation and Verification of current medications:   [X] Done	[ ] Not done, not eligible    Comments: Minimal PCA use.  Reporting nausea last pm improved with antiemetics PRN. Consider PRN analgesics
PT W/OUT ANY COMPLAINTS.  VSS. AFEBRILE.  ABD SOFT, NT  INCISION C/D/I  EXT NO SIGN OF DVT  - D/C HOME  - F/U 10 DAYS FOR STAPLE REMOVAL  - INSTRUCTIONS REVIEWED
Pain Management Attending Addendum    SUBJECTIVE:    Therapy:	  [x ] IV PCA	   [ ] Epidural           [ ] s/p Spinal Opoid              [ ] Postpartum infusion	  [ ] Patient controlled regional anesthesia (PCRA)    [ ] prn Analgesics    OBJECTIVE:   [x ] No new signs     [ ] Other:    Side Effects:  [x ] None			[ ] Other:        ASSESSMENT/PLAN  [ x] Continue current therapy    [ ] Therapy changed to:    [ ] IV PCA       [ ] Epidural     [ ] prn Analgesics     [ ] post partum infusion    Comments:
Pain Management Attending Addendum    SUBJECTIVE: Patient comfortable    Therapy:	  [x ] IV PCA	   [ ] Epidural           [ ] s/p Spinal Opoid              [ ] Postpartum infusion	  [ ] Patient controlled regional anesthesia (PCRA)    [ ] prn Analgesics    OBJECTIVE:   [x ] No new signs     [ ] Other:    Side Effects:  [ x] None			[ ] Other:    Assessment of Catheter Site:		[ ] Intact		[ ] Other:    ASSESSMENT/PLAN  [ ] Continue current therapy    [ ] Therapy changed to:    [ ] IV PCA       [ ] Epidural     [x ] prn Analgesics     [ ] post partum infusion    Comments:
R-1 GYN Progress Note  POD# 3	  HD#4    Patient seen and examined at bedside, no acute overnight events. No acute complaints, pain well controlled.  Patient is ambulating, passing flatus, voiding spontaneously, and tolerating regular diet. Denies CP, SOB, N/V, fevers, and chills.    Vital Signs Last 24 Hours  T(C): 37.1 (12-01-17 @ 02:34), Max: 37.1 (11-30-17 @ 14:12)  HR: 77 (12-01-17 @ 02:34) (60 - 78)  BP: 132/86 (12-01-17 @ 02:34) (132/86 - 145/89)  RR: 17 (12-01-17 @ 02:34) (16 - 17)  SpO2: 94% (12-01-17 @ 02:34) (94% - 96%)    I&O's Summary    29 Nov 2017 07:01  -  30 Nov 2017 07:00  --------------------------------------------------------  IN: 2790 mL / OUT: 1600 mL / NET: 1190 mL    30 Nov 2017 07:01  -  01 Dec 2017 06:29  --------------------------------------------------------  IN: 1390 mL / OUT: 0 mL / NET: 1390 mL        Physical Exam:  General: NAD  CV: NR, RR, S1, S2, no M/R/G  Lungs: CTA-B  Abdomen: Soft, non-tender, non-distended, +BS  Incision: vertical incision CDI. Staples.   Ext: No pain or swelling    Labs:                        10.3   10.9  )-----------( 250      ( 30 Nov 2017 06:00 )             31.4   baso x      eos x      imm gran x      lymph x      mono x      poly x                            10.8   14.5  )-----------( 244      ( 29 Nov 2017 05:35 )             32.6   baso x      eos x      imm gran x      lymph x      mono x      poly x                    Blood Type: A Positive      MEDICATIONS  (STANDING):  acetaminophen   Tablet. 975 milliGRAM(s) Oral every 6 hours  docusate sodium 100 milliGRAM(s) Oral two times a day  heparin  Injectable 5000 Unit(s) SubCutaneous every 12 hours  ibuprofen  Tablet 600 milliGRAM(s) Oral every 6 hours  influenza   Vaccine 0.5 milliLiter(s) IntraMuscular once    MEDICATIONS  (PRN):  oxyCODONE    IR 5 milliGRAM(s) Oral every 3 hours PRN Moderate Pain (4 - 6)
R2 Progress Note  POD#2  HD#3    Patient seen and examined at bedside, no acute overnight events. No acute complaints, pain well controlled on PCA.  Patient is ambulating, passing flatus, voiding spontaneously, and tolerating clear liquid diet. Denies CP, SOB, N/V, fevers, and chills.    Vital Signs Last 24 Hours  T(C): 36.9 (11-30-17 @ 05:58), Max: 37.3 (11-29-17 @ 16:48)  HR: 65 (11-30-17 @ 05:58) (65 - 77)  BP: 144/88 (11-30-17 @ 05:58) (144/88 - 153/90)  RR: 16 (11-30-17 @ 05:58) (16 - 17)  SpO2: 95% (11-30-17 @ 05:58) (95% - 97%)    I&O's Summary    28 Nov 2017 07:01  -  29 Nov 2017 07:00  --------------------------------------------------------  IN: 1475 mL / OUT: 2025 mL / NET: -550 mL    29 Nov 2017 07:01  -  30 Nov 2017 06:33  --------------------------------------------------------  IN: 2790 mL / OUT: 1600 mL / NET: 1190 mL        Physical Exam:  General: NAD  CV: NR, RR, S1, S2, no M/R/G  Lungs: CTAB  Abdomen: Soft, appropriate post-op tenderness, non-distended, +BS  Incision: Vertical midline CDI, with staples, slight erythema  Ext: No pain or swelling    Labs:                        10.3   10.9  )-----------( 250      ( 30 Nov 2017 06:00 )             31.4   baso x      eos x      imm gran x      lymph x      mono x      poly x                            10.8   14.5  )-----------( 244      ( 29 Nov 2017 05:35 )             32.6   baso x      eos x      imm gran x      lymph x      mono x      poly x                    Blood Type: A Positive      MEDICATIONS  (STANDING):  cefoTEtan  IVPB 2 Gram(s) IV Intermittent once  docusate sodium 100 milliGRAM(s) Oral two times a day  heparin  Injectable 5000 Unit(s) SubCutaneous every 12 hours  HYDROmorphone PCA (1 mG/mL) 30 milliLiter(s) PCA Continuous PCA Continuous  influenza   Vaccine 0.5 milliLiter(s) IntraMuscular once  lactated ringers. 1000 milliLiter(s) (125 mL/Hr) IV Continuous <Continuous>    MEDICATIONS  (PRN):  HYDROmorphone PCA (1 mG/mL) Rescue Clinician Bolus 0.5 milliGRAM(s) IV Push every 15 minutes PRN for Pain Scale GREATER THAN 6  ketorolac   Injectable 30 milliGRAM(s) IV Push every 6 hours PRN Mild Pain (1 - 3)  naloxone Injectable 0.1 milliGRAM(s) IV Push every 3 minutes PRN For ANY of the following changes in patient status:  A. RR LESS THAN 10 breaths per minute, B. Oxygen saturation LESS THAN 90%, C. Sedation score of 6  ondansetron Injectable 4 milliGRAM(s) IV Push every 6 hours PRN Nausea
R1 GYN Progress Note  POD# 1  HD#2    Patient seen and examined at bedside, no acute overnight events. No acute complaints, pain well controlled. Pt had a WOODS overnight that has resolved with tylenol.   Patient tolerated some water last night. Not yet out of bed. Not yet passing flatus.  Denies CP, SOB, N/V, fevers, and chills.    Vital Signs Last 24 Hours  T(C): 36.9 (11-29-17 @ 04:57), Max: 37 (11-28-17 @ 22:23)  HR: 72 (11-29-17 @ 04:57) (65 - 75)  BP: 156/92 (11-29-17 @ 04:57) (127/80 - 156/92)  RR: 17 (11-29-17 @ 04:57) (16 - 18)  SpO2: 95% (11-29-17 @ 04:57) (95% - 100%)    I&O's Summary    28 Nov 2017 07:01  -  29 Nov 2017 06:49  --------------------------------------------------------  IN: 1475 mL / OUT: 2025 mL / NET: -550 mL        Physical Exam:  General: NAD  CV: NR, RR, S1, S2, no M/R/G  Lungs: CTA-B  Abdomen: Soft, appropriately tender, non-distended, +BS  Incision: vertical incision, bandage in place  Ext: No pain or swelling    Labs:                        10.8   14.5  )-----------( 244      ( 29 Nov 2017 05:35 )             32.6   baso x      eos x      imm gran x      lymph x      mono x      poly x                    Blood Type: A Positive      MEDICATIONS  (STANDING):  cefoTEtan  IVPB 2 Gram(s) IV Intermittent once  docusate sodium 100 milliGRAM(s) Oral two times a day  heparin  Injectable 5000 Unit(s) SubCutaneous every 12 hours  HYDROmorphone PCA (1 mG/mL) 30 milliLiter(s) PCA Continuous PCA Continuous  ketorolac   Injectable 30 milliGRAM(s) IV Push every 6 hours  lactated ringers. 1000 milliLiter(s) (125 mL/Hr) IV Continuous <Continuous>    MEDICATIONS  (PRN):  HYDROmorphone PCA (1 mG/mL) Rescue Clinician Bolus 0.5 milliGRAM(s) IV Push every 15 minutes PRN for Pain Scale GREATER THAN 6  naloxone Injectable 0.1 milliGRAM(s) IV Push every 3 minutes PRN For ANY of the following changes in patient status:  A. RR LESS THAN 10 breaths per minute, B. Oxygen saturation LESS THAN 90%, C. Sedation score of 6  ondansetron Injectable 4 milliGRAM(s) IV Push every 6 hours PRN Nausea

## 2017-12-01 NOTE — PROGRESS NOTE ADULT - PROBLEM SELECTOR PLAN 1
Neuro: c/w PCA  CV: VSS, CBC this AM shows appropriate drop in Hct:  34.8->32.6  Pulm: Saturating well on room air, encourage incentive spirometry  GI: Advance to clear liquid diet  : UOP adequate, rand to be removed.   Heme: HSQ and SCDs for DVT ppx  Dispo: Continue routine post-op care    Ana, pgy-1
Neuro: c/w PCA  CV: VSS, CBC in AM  Pulm: Saturating well on room air, encourage incentive spirometry  GI: NPO, clears in AM, Zofran prn while on PCA  : UOP adequate, c/w rand  Heme: HSQ and SCDs for DVT ppx  Dispo: Continue routine post-op care    SHRUTHI Olivares, PGY-2  [phone]
Neuro: transition from PCA to oral analgesia  CV: hemodynamically stable  Pulm: saturating well on room air, encourage incentive spirometry and ambulation  GI: patient tolerating clear liquid diet and passing flatus, consider advancing to regular diet  : voiding adequately  Heme: ambulation, SCDs, and heparin for DVT ppx.  CBC stable    NORI Luong PGY2
Problem: S/P abdominal hysterectomy.    Plan: oral analgesia  CV: hemodynamically stable  Pulm: saturating well on room air, encourage incentive spirometry and ambulation  GI: regular diet  : voiding adequately  Heme: ambulation, SCDs, and heparin for DVT ppx.  CBC stable    -Pt to be discharged later today.   -Pt to follow-up in the office in 10 days for removal of staples

## 2017-12-01 NOTE — PROGRESS NOTE ADULT - PROBLEM SELECTOR PROBLEM 1
S/P abdominal hysterectomy

## 2017-12-01 NOTE — PROGRESS NOTE ADULT - ASSESSMENT
53 y/o s/p NADIA-BSO in stable condition post-op
51 y/o POD#2 s/p uncomplicated NADIA BSO.  Patient is hemodynamically stable, with pain well-controlled.  She has had resumed bowel function and is meeting all post-op milestones.
53 y/o POD#3 s/p uncomplicated NADIA BSO.  Patient is hemodynamically stable, with pain well-controlled.  She has had resumed bowel function and is meeting all post-op milestones.
Pt is a 51 yo POD#1, s/p total abdominal hysterectomy,  Bilateral Salpingo-oophorectomy w/ a vertical incision. Pt is HD stable and pain is well controlled.

## 2017-12-05 LAB — SURGICAL PATHOLOGY STUDY: SIGNIFICANT CHANGE UP

## 2020-07-18 PROBLEM — D25.1 INTRAMURAL LEIOMYOMA OF UTERUS: Chronic | Status: ACTIVE | Noted: 2017-11-21

## 2020-07-20 ENCOUNTER — APPOINTMENT (OUTPATIENT)
Dept: SURGERY | Facility: CLINIC | Age: 55
End: 2020-07-20

## 2021-07-21 NOTE — H&P PST ADULT - NS SC CAGE ALCOHOL GUILTY ABOUT
Impression: S/P Cataract Extraction by phacoemulsification with IOL placement; ORA; DEXYCU OD - 41 Days. Presence of intraocular lens  Z96.1. Post operative instructions reviewed - Plan: Return sooner if sudden vision change or increasing pain.
no

## 2021-11-08 ENCOUNTER — APPOINTMENT (OUTPATIENT)
Dept: GASTROENTEROLOGY | Facility: CLINIC | Age: 56
End: 2021-11-08

## 2022-04-22 NOTE — PATIENT PROFILE ADULT. - NSSUBSTANCEUSE_GEN_ALL_CORE_SD
Refill request for Doxazosin 2mg nightly.  Last refilled 09/16/21 #45 with 0 refills and note no further refills until office visit.   LOV 02/09/21.  NOV 04/28/22.  Unable to refill per protocol.  Refill deferred to PCP.        never used

## 2023-02-10 NOTE — H&P PST ADULT - ATTENDING COMMENTS
Subjective Seen and examined.  Still on high flow oxygen.  Very hard of hearing unable to give much history.    Objective     I/O's    Intake/Output Summary (Last 24 hours) at 2/9/2023 2148  Last data filed at 2/9/2023 1453  Gross per 24 hour   Intake 2104.78 ml   Output --   Net 2104.78 ml       Last Recorded Vitals  Blood pressure (!) 158/77, pulse 61, temperature 97.2 °F (36.2 °C), temperature source Axillary, resp. rate 20, height 5' 6\" (1.676 m), weight 59.2 kg (130 lb 8.2 oz), SpO2 98 %.  Body mass index is 21.07 kg/m².    Physical Exam  Vitals and nursing note reviewed.   Constitutional:       Appearance: She is ill-appearing.   HENT:      Head: Normocephalic and atraumatic.      Right Ear: External ear normal.      Left Ear: External ear normal.      Nose: Congestion present.      Mouth/Throat:      Mouth: Mucous membranes are moist.      Neck: Neck supple. No muscular tenderness.   Eyes:      General: No scleral icterus.        Right eye: No discharge.         Left eye: No discharge.      Extraocular Movements: Extraocular movements intact.      Conjunctiva/sclera: Conjunctivae normal.      Pupils: Pupils are equal, round, and reactive to light.   Neck:      Comments: Wearing [place] J collar  Cardiovascular:      Rate and Rhythm: Normal rate and regular rhythm.      Pulses: Normal pulses.      Heart sounds: Normal heart sounds. No murmur heard.    No gallop.   Pulmonary:      Effort: Pulmonary effort is normal. No respiratory distress.      Breath sounds: Rhonchi present. No wheezing.      Comments: Wet breath sounds, gurgling sound  Chest:      Chest wall: No tenderness.   Abdominal:      General: Abdomen is flat. Bowel sounds are normal. There is no distension.      Palpations: Abdomen is soft. There is no mass.      Tenderness: There is no abdominal tenderness. There is no guarding or rebound.      Hernia: No hernia is present.   Musculoskeletal:         General: No tenderness or signs of injury. Normal  range of motion.      Right lower leg: No edema.      Left lower leg: No edema.   Skin:     General: Skin is warm.      Coloration: Skin is not jaundiced or pale.      Findings: No bruising, erythema, lesion or rash.   Neurological:      General: No focal deficit present.      Mental Status: She is alert. Mental status is at baseline.      Cranial Nerves: No cranial nerve deficit.      Sensory: No sensory deficit.      Motor: No weakness.      Coordination: Coordination normal.      Gait: Gait normal.   Psychiatric:         Mood and Affect: Mood normal.         Behavior: Behavior normal.         Labs       Imaging      Assessment & Plan   * Aspiration pneumonia of both lower lobes, unspecified aspiration pneumonia type (CMS/HCC)  Assessment & Plan  Continue Zosyn and Doxy.  Oxygen and wean as able.  Speech therapy number appreciated.  Patient is high risk but started on puréed diet..  Concern for COVID as contributing to the patient's symptoms as well.  Dexamethasone was added by infectious disease.  Remdesivir was also added.  Monitor closely.      COVID-19 virus infection  Assessment & Plan  COVID-19 possibly contributing to the patient symptoms.  Patient's breath sounds concerning for any aspiration but COVID-19 would not help either.  Started Remdesivir and dexamethasone due to hypoxia.  Discussed with infectious disease.      Acute respiratory failure with hypoxia (CMS/HCC)  Assessment & Plan  Due to aspiration event.  We will consult ID given the history of positive COVID status.    Anxiety  Assessment & Plan  Continue home meds.    Hypothyroidism  Assessment & Plan  Continue Synthroid.      Hypertension  Assessment & Plan  Blood pressure slightly elevated.  Hydralazine as needed.  Continue meds..    History of spinal fracture  Assessment & Plan  [place] J collar and chest immobilizer.  PT OT.  Pain medicine as needed.      DVT Prophylaxis    Current Active Medications for DVT Prophylaxis (From admission,  onward)         Stop     heparin (porcine) injection 5,000 Units  5,000 Units,   Subcutaneous,   3 times per day         --                  Smoking Cessation  Counseling given: Not Answered                 LARGE FIBROID UTERUS FOR HYSTERECTOMY AND BSO.  RISKS/BENEFITS, INDICATIONS AND ALTERNATIVES D/W PT AND CLEARLY UNDERSTOOD. QUESTIONS ANSWERED AND INFORMED CONSENT OBTAINED.   PT WANTS REMOVAL OF BOTH TUBES AND OVARIES.

## 2024-09-04 NOTE — PATIENT PROFILE ADULT. - FUNCTIONAL SCREEN CURRENT LEVEL: BATHING, MLM
Medication: passed protocol.   Last office visit date: 8-8-24  Next appointment scheduled?: No;Sent to nurse pool for review and to schedule as no future appointment was stated in last office visit note.     Number of refills given:       Orders pended, and routed to provider's nurse pool for review and or approval.   Please route any notes back to your nursing pool.       Thank you, Refill Center Staff     (0) independent

## 2024-10-30 NOTE — H&P PST ADULT - DOCUMENT STATUS
Wash hands before and after wound care.  Clean with saline and pat dry.  Apply non-stick gauze and secure with tape   Authored by Resident/PA/NP

## 2025-06-22 NOTE — H&P PST ADULT - NS PRO PASSIVE SMOKE EXP
ED Course/MDM:    Diagnosis  The encounter diagnosis was Pain in right lumbar region of back.    Follow Up:  Kierra Bray MD  [address] [address]  [phone]    In 1 week         Current Discharge Medication List        New Prescriptions    Details   predniSONE (DELTASONE) 50 MG tablet Take 1 tablet by mouth daily for 4 days. Begin taking on June 23, 2025.  Qty: 4 tablet, Refills: 0      HYDROcodone-acetaminophen (NORCO) 5-325 MG per tablet Take 1 tablet by mouth every 8 hours as needed for Pain.  Qty: 12 tablet, Refills: 0             Disposition:  Discharge 6/22/2025  1:07 PM  Kayla Clay discharge to home/self care.    Ambulates slow with steady gait     Syncope    Syncope is when you temporarily lose consciousness, also called fainting or passing out. It is caused by a sudden decrease in blood flow to the brain. Even though most causes of syncope are not dangerous, syncope can possibly be a sign of a serious medical problem. Signs that you may be about to faint include feeling dizzy, lightheaded, nausea, visual changes, or cold/clammy skin. Do not drive, operate heavy machinery, or play sports until your health care provider says it is okay.    SEEK IMMEDIATE MEDICAL CARE IF YOU HAVE ANY OF THE FOLLOWING SYMPTOMS: severe headache, pain in your chest/abdomen/back, bleeding from your mouth or rectum, palpitations, shortness of breath, pain with breathing, seizure, confusion, or trouble walking. No